# Patient Record
Sex: FEMALE | Race: WHITE | URBAN - METROPOLITAN AREA
[De-identification: names, ages, dates, MRNs, and addresses within clinical notes are randomized per-mention and may not be internally consistent; named-entity substitution may affect disease eponyms.]

---

## 2023-04-04 ENCOUNTER — LAB OUTSIDE AN ENCOUNTER (OUTPATIENT)
Dept: URBAN - METROPOLITAN AREA CLINIC 19 | Facility: CLINIC | Age: 78
End: 2023-04-04

## 2023-04-04 ENCOUNTER — OFFICE VISIT (OUTPATIENT)
Dept: URBAN - METROPOLITAN AREA CLINIC 19 | Facility: CLINIC | Age: 78
End: 2023-04-04
Payer: MEDICARE

## 2023-04-04 ENCOUNTER — DASHBOARD ENCOUNTERS (OUTPATIENT)
Age: 78
End: 2023-04-04

## 2023-04-04 ENCOUNTER — WEB ENCOUNTER (OUTPATIENT)
Dept: URBAN - METROPOLITAN AREA CLINIC 19 | Facility: CLINIC | Age: 78
End: 2023-04-04

## 2023-04-04 VITALS
TEMPERATURE: 98.3 F | WEIGHT: 106.6 LBS | DIASTOLIC BLOOD PRESSURE: 70 MMHG | HEART RATE: 65 BPM | SYSTOLIC BLOOD PRESSURE: 102 MMHG | BODY MASS INDEX: 18.89 KG/M2 | OXYGEN SATURATION: 97 % | HEIGHT: 63 IN

## 2023-04-04 DIAGNOSIS — K59.09 CHRONIC CONSTIPATION: ICD-10-CM

## 2023-04-04 DIAGNOSIS — R12 HEARTBURN: ICD-10-CM

## 2023-04-04 DIAGNOSIS — K21.9 GASTROESOPHAGEAL REFLUX DISEASE, UNSPECIFIED WHETHER ESOPHAGITIS PRESENT: ICD-10-CM

## 2023-04-04 DIAGNOSIS — Z86.010 PERSONAL HISTORY OF COLONIC POLYPS: ICD-10-CM

## 2023-04-04 DIAGNOSIS — K62.5 RECTAL BLEED: ICD-10-CM

## 2023-04-04 PROBLEM — 428283002: Status: ACTIVE | Noted: 2023-04-04

## 2023-04-04 PROBLEM — 235595009: Status: ACTIVE | Noted: 2023-04-04

## 2023-04-04 PROCEDURE — 99204 OFFICE O/P NEW MOD 45 MIN: CPT | Performed by: NURSE PRACTITIONER

## 2023-04-04 RX ORDER — LIOTHYRONINE SODIUM 5 UG/1
1 TABLET ON AN EMPTY STOMACH TABLET ORAL ONCE A DAY
Status: ACTIVE | COMMUNITY

## 2023-04-04 RX ORDER — LEVOTHYROXINE SODIUM 50 UG/1
1 TABLET IN THE MORNING ON AN EMPTY STOMACH TABLET ORAL ONCE A DAY
Status: ACTIVE | COMMUNITY

## 2023-04-04 NOTE — HPI-TODAY'S VISIT:
Ms. Juárez is a 79 yo female with PMH of hypothyroidism, GERD, prediabetes who presents today for c/o abdominal pain and due for screening colon.  She has two homes, one here and one in [place] and goes back and forth. She is currently in town.   She reports having constipation over the past 2 years. Lower abdominal cramping which is relieved after she has a BM. Takes senna as needed which she states helps. So with this, she is having a BM at least everyday or every other day. Occasional straining to have a BM. Reports rectal bleeding on tissue when wiping, sometimes in the toilet. States she has a known hemorrhoid. Bleeding occasionally especially if straining. No black stools. Stools are very hard. Sits on toilet for long periods of time. Pain does resolve after she has a BM. Appetite is good, no unintentional weight loss.    Reporting a lot of gas. Persistent chronic cough over past 5 years. Heartburn/reflux has gotten somewhat worse. Was prescribed Prilosec in the past but she states she does not take it, gives her gas so she drinks baking soda as needed. No N/V.   Last colonoscopy was over 5 years ago in [place]. She reports it was normal but has a history of colon polyps in the past. Denies any family history of colon cancer or colon polyps.  No signifiant cardio-pulm disease. She is not on any blood thinners. Rare NSAID use. No aspirin. No pacemaker or cardiac stents..

## 2023-05-02 ENCOUNTER — OFFICE VISIT (OUTPATIENT)
Dept: URBAN - METROPOLITAN AREA LAB 2 | Facility: LAB | Age: 78
End: 2023-05-02
Payer: MEDICARE

## 2023-05-02 DIAGNOSIS — Z09 CARDIOLOGY FOLLOW-UP ENCOUNTER: ICD-10-CM

## 2023-05-02 DIAGNOSIS — K22.89 DILATATION OF ESOPHAGUS: ICD-10-CM

## 2023-05-02 DIAGNOSIS — K29.60 ADENOPAPILLOMATOSIS GASTRICA: ICD-10-CM

## 2023-05-02 DIAGNOSIS — Z86.010 ADENOMAS PERSONAL HISTORY OF COLONIC POLYPS: ICD-10-CM

## 2023-05-02 PROCEDURE — 43239 EGD BIOPSY SINGLE/MULTIPLE: CPT | Performed by: INTERNAL MEDICINE

## 2023-05-02 PROCEDURE — G0105 COLORECTAL SCRN; HI RISK IND: HCPCS | Performed by: INTERNAL MEDICINE

## 2023-05-02 RX ORDER — LIOTHYRONINE SODIUM 5 UG/1
1 TABLET ON AN EMPTY STOMACH TABLET ORAL ONCE A DAY
Status: ACTIVE | COMMUNITY

## 2023-05-02 RX ORDER — LEVOTHYROXINE SODIUM 50 UG/1
1 TABLET IN THE MORNING ON AN EMPTY STOMACH TABLET ORAL ONCE A DAY
Status: ACTIVE | COMMUNITY

## 2023-05-08 ENCOUNTER — TELEPHONE ENCOUNTER (OUTPATIENT)
Dept: URBAN - METROPOLITAN AREA CLINIC 23 | Facility: CLINIC | Age: 78
End: 2023-05-08

## 2023-05-08 ENCOUNTER — TELEPHONE ENCOUNTER (OUTPATIENT)
Dept: URBAN - METROPOLITAN AREA CLINIC 19 | Facility: CLINIC | Age: 78
End: 2023-05-08

## 2023-05-15 ENCOUNTER — TELEPHONE ENCOUNTER (OUTPATIENT)
Dept: URBAN - METROPOLITAN AREA CLINIC 19 | Facility: CLINIC | Age: 78
End: 2023-05-15

## 2024-06-21 ENCOUNTER — OFFICE VISIT (OUTPATIENT)
Dept: URBAN - METROPOLITAN AREA CLINIC 19 | Facility: CLINIC | Age: 79
End: 2024-06-21
Payer: MEDICARE

## 2024-06-21 VITALS
BODY MASS INDEX: 18.96 KG/M2 | DIASTOLIC BLOOD PRESSURE: 84 MMHG | OXYGEN SATURATION: 94 % | TEMPERATURE: 97.7 F | WEIGHT: 107 LBS | SYSTOLIC BLOOD PRESSURE: 128 MMHG | HEART RATE: 78 BPM | HEIGHT: 63 IN

## 2024-06-21 DIAGNOSIS — R05.3 CHRONIC COUGH: ICD-10-CM

## 2024-06-21 DIAGNOSIS — K64.8 INTERNAL HEMORRHOID, BLEEDING: ICD-10-CM

## 2024-06-21 DIAGNOSIS — K21.9 GERD: ICD-10-CM

## 2024-06-21 PROCEDURE — 99213 OFFICE O/P EST LOW 20 MIN: CPT | Performed by: NURSE PRACTITIONER

## 2024-06-21 RX ORDER — LIOTHYRONINE SODIUM 5 UG/1
1 TABLET ON AN EMPTY STOMACH TABLET ORAL ONCE A DAY
Status: ACTIVE | COMMUNITY

## 2024-06-21 RX ORDER — LEVOTHYROXINE SODIUM 50 UG/1
1 TABLET IN THE MORNING ON AN EMPTY STOMACH TABLET ORAL ONCE A DAY
Status: ACTIVE | COMMUNITY

## 2024-06-21 RX ORDER — PANTOPRAZOLE SODIUM 40 MG/1
1 TABLET TABLET, DELAYED RELEASE ORAL ONCE A DAY
Qty: 90 TABLET | Refills: 3 | OUTPATIENT
Start: 2024-06-21

## 2024-06-21 RX ORDER — DIPHENHYDRAMINE HYDROCHLORIDE 25 MG/1
1 CAPSULE AT BEDTIME AS NEEDED CAPSULE ORAL ONCE A DAY
Status: ACTIVE | COMMUNITY

## 2024-06-21 NOTE — PHYSICAL EXAM GASTROINTESTINAL
Abdomen , soft, nontender, nondistended , no guarding or rigidity , normal bowel sounds , Liver and Spleen,  no hepatosplenomegaly

## 2024-06-21 NOTE — HPI-TODAY'S VISIT:
Ms. Juárez is a 79-year-old female with PMH of hypothyroidism, GERD, prediabetes who presents today for follow-up.  She was last seen in GI clinic 4/4/2023 planing of abdominal pain and was due for screening colonoscopy.  She reported chronic constipation over the past 2 years with lower abdominal cramping relieved after having a BM.  Takes senna as needed which helps.  She also reported having a lot of gas and chronic cough with worsening heartburn/reflux.  She would take baking soda as needed to provide relief. EGD/colonoscopy was performed by Dr. Godfrey EGD on 5/2/2023-ectopic gastric mucosa in the upper third of the esophagus.  Erythematous mucosa in the antrum.  Normal duodenum.  Path: Gastric antrum-slight nonspecific chronic inflammation; negative for H. pylori.  Esophagus middle and lower third-basaloid hyperplasia; negative for intestinal metaplasia.  Upper esophagus was negative Colonoscopy was normal besides diverticulosis and small internal hemorrhoids.  No specimens collected.  5-year follow-up given .   Reports bothersome internal hemorrhoid that can bleed in her underwear and pop out.  Complains of having a lot of belching, has to take baking soda "to get it out."    ------------------- Colonoscopy in 2019 in [place], reported normal but she has a history of colon polyps in the past. Denies any family history of colon cancer or colon polyps.

## 2024-06-24 ENCOUNTER — OFFICE VISIT (OUTPATIENT)
Dept: URBAN - METROPOLITAN AREA CLINIC 19 | Facility: CLINIC | Age: 79
End: 2024-06-24

## 2024-06-24 RX ORDER — PANTOPRAZOLE SODIUM 40 MG/1
1 TABLET TABLET, DELAYED RELEASE ORAL ONCE A DAY
Qty: 90 TABLET | Refills: 3 | Status: ACTIVE | COMMUNITY
Start: 2024-06-21

## 2024-06-24 RX ORDER — LIOTHYRONINE SODIUM 5 UG/1
1 TABLET ON AN EMPTY STOMACH TABLET ORAL ONCE A DAY
Status: ACTIVE | COMMUNITY

## 2024-06-24 RX ORDER — DIPHENHYDRAMINE HYDROCHLORIDE 25 MG/1
1 CAPSULE AT BEDTIME AS NEEDED CAPSULE ORAL ONCE A DAY
Status: ACTIVE | COMMUNITY

## 2024-06-24 RX ORDER — LEVOTHYROXINE SODIUM 50 UG/1
1 TABLET IN THE MORNING ON AN EMPTY STOMACH TABLET ORAL ONCE A DAY
Status: ACTIVE | COMMUNITY

## 2024-06-24 NOTE — HPI-TODAY'S VISIT:
Ms. Juárez is a 79-year-old female with PMH of hypothyroidism, GERD, prediabetes who presents today for follow-up.  She was last seen in GI clinic 4/4/2023 planing of abdominal pain and was due for screening colonoscopy.  She reported chronic constipation over the past 2 years with lower abdominal cramping relieved after having a BM.  Takes senna as needed which helps.  She also reported having a lot of gas and chronic cough with worsening heartburn/reflux.  She would take baking soda as needed to provide relief. EGD/colonoscopy was performed by Dr. Godfrey EGD on 5/2/2023-ectopic gastric mucosa in the upper third of the esophagus.  Erythematous mucosa in the antrum.  Normal duodenum.  Path: Gastric antrum-slight nonspecific chronic inflammation; negative for H. pylori.  Esophagus middle and lower third-basaloid hyperplasia; negative for intestinal metaplasia.  Upper esophagus was negative Colonoscopy was normal besides diverticulosis and small internal hemorrhoids.  No specimens collected.  5-year follow-up given .   Reports bothersome internal hemorrhoid that can bleed in her underwear and pop out.  Complains of having a lot of belching, has to take baking soda "to get it out."  6/24/24-  Here for hemorrhoid banding.  No new symptoms.     ------------------- Colonoscopy in 2019 in [place], reported normal but she has a history of colon polyps in the past. Denies any family history of colon cancer or colon polyps.